# Patient Record
Sex: MALE | Race: WHITE | Employment: FULL TIME | URBAN - METROPOLITAN AREA
[De-identification: names, ages, dates, MRNs, and addresses within clinical notes are randomized per-mention and may not be internally consistent; named-entity substitution may affect disease eponyms.]

---

## 2018-01-19 PROBLEM — S86.891D SHIN SPLINTS, RIGHT, SUBSEQUENT ENCOUNTER: Status: ACTIVE | Noted: 2018-01-19

## 2018-04-14 ENCOUNTER — HOSPITAL ENCOUNTER (OUTPATIENT)
Age: 28
Discharge: HOME | End: 2018-04-14
Attending: FAMILY MEDICINE
Payer: COMMERCIAL

## 2018-04-14 VITALS
RESPIRATION RATE: 20 BRPM | DIASTOLIC BLOOD PRESSURE: 82 MMHG | HEART RATE: 97 BPM | TEMPERATURE: 98 F | SYSTOLIC BLOOD PRESSURE: 135 MMHG | OXYGEN SATURATION: 100 %

## 2018-04-14 DIAGNOSIS — M25.512 NONTRAUMATIC PAIN OF LEFT SHOULDER: Primary | ICD-10-CM

## 2018-04-14 PROCEDURE — 99214 OFFICE O/P EST MOD 30 MIN: CPT

## 2018-04-14 PROCEDURE — 99213 OFFICE O/P EST LOW 20 MIN: CPT

## 2018-04-14 RX ORDER — MELOXICAM 15 MG/1
15 TABLET ORAL DAILY
Qty: 15 TABLET | Refills: 0 | Status: SHIPPED | OUTPATIENT
Start: 2018-04-14 | End: 2018-04-29

## 2018-04-14 RX ORDER — METHYLPREDNISOLONE 4 MG/1
TABLET ORAL
Qty: 1 PACKAGE | Refills: 0 | Status: SHIPPED | OUTPATIENT
Start: 2018-04-14 | End: 2018-05-02 | Stop reason: ALTCHOICE

## 2018-04-14 NOTE — ED INITIAL ASSESSMENT (HPI)
Pt here with c/o left shoulder pain, left arm tingling x 1 week. Pt has appt with ortho later this week. States he can's lift anything.

## 2018-04-15 NOTE — ED PROVIDER NOTES
Patient Seen in: [facility]    History   Patient presents with:  Arm Pain    Stated Complaint: Left shoulder/arm pain    HPI     28year old male presents for left shoulder and arm pain.  Patient states he has left scapula/should to light. Neck: Normal range of motion. Neck supple. Cardiovascular: Normal rate, regular rhythm, normal heart sounds and intact distal pulses.     Pulmonary/Chest: Effort normal and breath sounds normal.   Musculoskeletal:   Left shoulder exam with mus

## 2021-01-13 PROBLEM — T50.905S ADVERSE EFFECT OF UNSPECIFIED DRUGS, MEDICAMENTS AND BIOLOGICAL SUBSTANCES, SEQUELA: Status: ACTIVE | Noted: 2021-01-13

## 2021-01-13 PROBLEM — F31.9 BIPOLAR DISORDER (HCC): Status: ACTIVE | Noted: 2021-01-13

## 2021-01-13 PROBLEM — F43.9 STRESS: Status: ACTIVE | Noted: 2021-01-13

## 2021-01-14 PROBLEM — F40.10 SOCIAL ANXIETY DISORDER: Status: ACTIVE | Noted: 2021-01-14

## 2021-01-14 PROBLEM — Z72.0 NICOTINE VAPOR PRODUCT USER: Status: ACTIVE | Noted: 2021-01-14

## 2021-04-23 PROBLEM — F12.10 CANNABIS USE DISORDER, MILD, ABUSE: Status: ACTIVE | Noted: 2021-04-23

## 2021-06-18 PROBLEM — F12.90 USE OF CANNABIS: Status: ACTIVE | Noted: 2021-06-18

## 2023-02-16 NOTE — ED INITIAL ASSESSMENT (HPI)
Pt states was on norco 5 for one week month ago  Stopped for one weeks, had neck surgery on 01/31 and was\taking norco 10mg TID for weeks, stopped on sat and now feeling nauseeated, tremors,fast heart rate,